# Patient Record
Sex: MALE | Race: BLACK OR AFRICAN AMERICAN | NOT HISPANIC OR LATINO | URBAN - METROPOLITAN AREA
[De-identification: names, ages, dates, MRNs, and addresses within clinical notes are randomized per-mention and may not be internally consistent; named-entity substitution may affect disease eponyms.]

---

## 2017-12-16 ENCOUNTER — EMERGENCY (EMERGENCY)
Facility: HOSPITAL | Age: 22
LOS: 1 days | Discharge: HOME | End: 2017-12-16
Admitting: EMERGENCY MEDICINE
Payer: COMMERCIAL

## 2017-12-16 VITALS
SYSTOLIC BLOOD PRESSURE: 126 MMHG | HEART RATE: 78 BPM | TEMPERATURE: 97 F | DIASTOLIC BLOOD PRESSURE: 52 MMHG | RESPIRATION RATE: 18 BRPM | OXYGEN SATURATION: 98 %

## 2017-12-16 PROCEDURE — 73130 X-RAY EXAM OF HAND: CPT

## 2017-12-16 PROCEDURE — 73130 X-RAY EXAM OF HAND: CPT | Mod: 26,RT

## 2017-12-16 PROCEDURE — 99283 EMERGENCY DEPT VISIT LOW MDM: CPT | Mod: 25

## 2017-12-16 PROCEDURE — 99283 EMERGENCY DEPT VISIT LOW MDM: CPT

## 2017-12-16 RX ORDER — BACITRACIN ZINC 500 UNIT/G
1 OINTMENT IN PACKET (EA) TOPICAL ONCE
Qty: 0 | Refills: 0 | Status: COMPLETED | OUTPATIENT
Start: 2017-12-16 | End: 2017-12-16

## 2017-12-16 RX ADMIN — Medication 1 APPLICATION(S): at 22:42
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
## 2017-12-16 NOTE — ED PROVIDER NOTE - MUSCULOSKELETAL, MLM
R hand: no swelling, no dislocation, radial pulse 2+, a very superficial abrasion to proximal 2nd phalanx and a small abrasion to mid thumb, no lac to suture, + tend over 2nd and 3rd metacarpals, FROM, no U/M/R nerve deficits, muscle strength 5/5, good resistance
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
## 2017-12-16 NOTE — ED ADULT NURSE NOTE - CHPI ED SYMPTOMS NEG
no red streaks/no drainage/no chills/no purulent drainage/no redness/no vomiting/no fever/no bleeding at site
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
## 2017-12-20 DIAGNOSIS — S60.311A ABRASION OF RIGHT THUMB, INITIAL ENCOUNTER: ICD-10-CM

## 2017-12-20 DIAGNOSIS — S60.221A CONTUSION OF RIGHT HAND, INITIAL ENCOUNTER: ICD-10-CM

## 2017-12-20 DIAGNOSIS — S60.410A ABRASION OF RIGHT INDEX FINGER, INITIAL ENCOUNTER: ICD-10-CM
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
